# Patient Record
Sex: MALE | URBAN - METROPOLITAN AREA
[De-identification: names, ages, dates, MRNs, and addresses within clinical notes are randomized per-mention and may not be internally consistent; named-entity substitution may affect disease eponyms.]

---

## 2022-02-01 ENCOUNTER — WEB ENCOUNTER (OUTPATIENT)
Dept: URBAN - METROPOLITAN AREA CLINIC 82 | Facility: CLINIC | Age: 42
End: 2022-02-01

## 2022-02-15 ENCOUNTER — LAB OUTSIDE AN ENCOUNTER (OUTPATIENT)
Dept: URBAN - METROPOLITAN AREA CLINIC 115 | Facility: CLINIC | Age: 42
End: 2022-02-15

## 2022-02-15 ENCOUNTER — OFFICE VISIT (OUTPATIENT)
Dept: URBAN - METROPOLITAN AREA CLINIC 115 | Facility: CLINIC | Age: 42
End: 2022-02-15
Payer: SELF-PAY

## 2022-02-15 VITALS
HEIGHT: 68 IN | HEART RATE: 53 BPM | DIASTOLIC BLOOD PRESSURE: 68 MMHG | WEIGHT: 201 LBS | TEMPERATURE: 98 F | SYSTOLIC BLOOD PRESSURE: 110 MMHG | BODY MASS INDEX: 30.46 KG/M2 | RESPIRATION RATE: 14 BRPM

## 2022-02-15 DIAGNOSIS — E66.8 OTHER OBESITY: ICD-10-CM

## 2022-02-15 DIAGNOSIS — R16.0 LIVER MASS: ICD-10-CM

## 2022-02-15 PROBLEM — 300332007: Status: ACTIVE | Noted: 2022-02-15

## 2022-02-15 PROBLEM — 443371000124107: Status: ACTIVE | Noted: 2022-02-15

## 2022-02-15 PROCEDURE — 99244 OFF/OP CNSLTJ NEW/EST MOD 40: CPT | Performed by: INTERNAL MEDICINE

## 2022-02-15 PROCEDURE — 99204 OFFICE O/P NEW MOD 45 MIN: CPT | Performed by: INTERNAL MEDICINE

## 2022-02-15 NOTE — HPI-TODAY'S VISIT:
40 y/o  with Obesity and fatty liver that was refer here by Urology after CT shows 5cm hyperdense mass. He denies any liver related symptoms, but he was told that he had fatty liver disease in the past. Denies alcohol abuse or IVDA.No family hx of liver disease.Liver enzymes were normal on most recent work-up.  The patient was referred by Kristian Johnson for abnormal imaging .   A copy of this document is being forwarded to the referring provider.

## 2022-02-16 LAB
AFP, SERUM, TUMOR MARKER: 3
CA 19-9: 6
HBSAG SCREEN: NEGATIVE
HCV AB: <0.1
INTERPRETATION:: (no result)

## 2022-04-18 ENCOUNTER — TELEPHONE ENCOUNTER (OUTPATIENT)
Dept: URBAN - METROPOLITAN AREA CLINIC 115 | Facility: CLINIC | Age: 42
End: 2022-04-18

## 2022-05-18 ENCOUNTER — TELEPHONE ENCOUNTER (OUTPATIENT)
Dept: URBAN - METROPOLITAN AREA CLINIC 115 | Facility: CLINIC | Age: 42
End: 2022-05-18

## 2023-02-03 ENCOUNTER — OFFICE VISIT (OUTPATIENT)
Dept: URBAN - METROPOLITAN AREA CLINIC 82 | Facility: CLINIC | Age: 43
End: 2023-02-03

## 2023-02-03 ENCOUNTER — DASHBOARD ENCOUNTERS (OUTPATIENT)
Age: 43
End: 2023-02-03

## 2023-02-03 ENCOUNTER — OFFICE VISIT (OUTPATIENT)
Dept: URBAN - METROPOLITAN AREA CLINIC 82 | Facility: CLINIC | Age: 43
End: 2023-02-03
Payer: SELF-PAY

## 2023-02-03 ENCOUNTER — LAB OUTSIDE AN ENCOUNTER (OUTPATIENT)
Dept: URBAN - METROPOLITAN AREA CLINIC 82 | Facility: CLINIC | Age: 43
End: 2023-02-03

## 2023-02-03 VITALS
HEART RATE: 77 BPM | WEIGHT: 199.4 LBS | SYSTOLIC BLOOD PRESSURE: 120 MMHG | HEIGHT: 68 IN | TEMPERATURE: 97.8 F | BODY MASS INDEX: 30.22 KG/M2 | DIASTOLIC BLOOD PRESSURE: 82 MMHG

## 2023-02-03 DIAGNOSIS — R16.0 LIVER MASS: ICD-10-CM

## 2023-02-03 DIAGNOSIS — E66.9 OBESITY (BMI 30.0-34.9): ICD-10-CM

## 2023-02-03 PROCEDURE — 99212 OFFICE O/P EST SF 10 MIN: CPT | Performed by: INTERNAL MEDICINE

## 2023-02-03 NOTE — HPI-TODAY'S VISIT:
liver lesion with normal liver synthetic function and tumor markers,viral hepatitis negaive,he denies alcohol use. I request liver protocol Ct after initial encounter but still pending due patient unable to schedule it. otherwise he denies signifiant abdominal symptoms or changes since initial encounter,stable weight.